# Patient Record
Sex: MALE | Race: WHITE | NOT HISPANIC OR LATINO | Employment: OTHER | URBAN - METROPOLITAN AREA
[De-identification: names, ages, dates, MRNs, and addresses within clinical notes are randomized per-mention and may not be internally consistent; named-entity substitution may affect disease eponyms.]

---

## 2022-05-11 ENCOUNTER — TRANSCRIBE ORDERS (OUTPATIENT)
Dept: PHYSICAL THERAPY | Facility: CLINIC | Age: 87
End: 2022-05-11

## 2022-05-11 DIAGNOSIS — M25.552 PAIN IN LEFT HIP: Primary | ICD-10-CM

## 2022-05-16 ENCOUNTER — TREATMENT (OUTPATIENT)
Dept: PHYSICAL THERAPY | Facility: CLINIC | Age: 87
End: 2022-05-16

## 2022-05-16 DIAGNOSIS — M25.552 LEFT HIP PAIN: Primary | ICD-10-CM

## 2022-05-16 PROCEDURE — 97162 PT EVAL MOD COMPLEX 30 MIN: CPT | Performed by: PHYSICAL THERAPIST

## 2022-05-16 PROCEDURE — 97110 THERAPEUTIC EXERCISES: CPT | Performed by: PHYSICAL THERAPIST

## 2022-05-16 NOTE — PROGRESS NOTES
Physical Therapy Initial Evaluation and Plan of Care        Patient: Cresencio Blunt   : 3/29/1932  Diagnosis/ICD-10 Code:  Left hip pain [M25.552]  Referring practitioner: MILE Roy  Date of Initial Visit: 2022  Today's Date: 2022  Patient seen for 1 sessions           Subjective Questionnaire: LEFS:       Subjective Evaluation    History of Present Illness  Date of onset: 2022  Mechanism of injury: S/p L intertrochanteric fracture treated by CMN/IMN 15 weeks ago. Patient fell and fractured his L hip when his L foot caused him to trip. He did not have AFO or use AD prior to hip fracture. He reports living with L foot drop since 's after low back injury experienced in [place]. Patient spent 6 weeks in SNF and felt little benefit from stay.     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  Location: L hip and knee  Exacerbated by: weight bearing, certain movements.    Social Support  Lives in: multiple-level home (with chair lift)    Hand dominance: right    Patient Goals  Patient goals for therapy: independence with ADLs/IADLs, increased strength and improved balance             Objective          Active Range of Motion   Left Hip   Flexion: 95 degrees     Right Hip   Flexion: 120 degrees   Left Knee   Flexion: 125 degrees   Extension: WFL    Right Knee   Flexion: 130 degrees   Extension: WFL    Passive Range of Motion   Left Hip   Flexion: 110 degrees with pain  Abduction: 20 degrees  with pain  Adduction: WFL  External rotation (90/90): 25 degrees with pain  Internal rotation (90/90): 15 degrees with pain    Strength/Myotome Testing     Left Hip   Planes of Motion   Flexion: 4-  Extension: 3- (Tested in plantigrade)  Abduction: 2+  External rotation: 3    Right Hip   Planes of Motion   Flexion: 4+  Extension: 3- (Tested in plantigrade)  Abduction: 3-  External rotation: 4    Left Knee   Flexion: 4  Extension: 3+    Right Knee   Flexion: 4+  Extension: 4+    Left Ankle/Foot   Left ankle dorsiflexion strength: AFO.  Left ankle plantar flexion strength: AFO.  Left ankle inversion strength: AFO.  Left ankle eversion strength: AFO.    Right Ankle/Foot   Dorsiflexion: 4+  Plantar flexion: 4  Inversion: 4+  Eversion: 4    Additional Strength Details  15 deg quad lag on L during SLR    Tests     Left Hip   Positive FADIR.   Negative scour.     Ambulation     Comments   Reciprocal patterning using Rollator, forward trunk lean with increased WB through UEs, lateral off of balance toward R with self correction    When using gait belt for CGA without AD, patient demonstrated loss of balance requiring PT assist when changing direction, initial contact foot flat with each foot when cued to hit with heel first on the opposite side.    Functional Assessment     Single Leg Stance   Left: 0 seconds  Right: 2 seconds    Comments  5x sit to stand: 30 sec with B UEs on knees, 4-5 failed standing attempts          Assessment & Plan     Assessment  Impairments: abnormal coordination, abnormal gait, abnormal or restricted ROM, activity intolerance, impaired balance, impaired physical strength, lacks appropriate home exercise program, pain with function, safety issue and weight-bearing intolerance  Functional Limitations: carrying objects, sleeping, walking, uncomfortable because of pain, moving in bed, sitting and standing  Assessment details: Patient presents with L>R LE muscle weakness and gait/balance impairment  secondary to L hip fracture treated with CMN/IMN 15 weeks ago and chronic L foot drop.     Barriers to therapy: age    Goals  Plan Goals: Short Term Goals 4 weeks  1. Patient to be compliant with initial HEP  2. 5x sit to  < 20 sec with B UEs.  3. LE strength to >3/5   4. Patient to improve LEFS score to at least 30/80.    Long Term Goals 8 weeks  1. Patient to be independent with HEP.  2. 5x sit to  < 15 sec without UE assist.  3. Patient to ambulate community distances with least restrictive AD or no AD at all  4. Patient to demonstrate >10 sec each with single leg stance time.  5. Patient to improve LEFS score to at least 35/80.    Plan  Therapy options: will be seen for skilled therapy services  Planned modality interventions: dry needling, cryotherapy, electrical stimulation/Russian stimulation, TENS and thermotherapy (hydrocollator packs)  Planned therapy interventions: manual therapy, motor coordination training, neuromuscular re-education, postural training, soft tissue mobilization, spinal/joint mobilization, strengthening, stretching, therapeutic activities, transfer training, IADL retraining, joint mobilization, home exercise program, functional ROM exercises, flexibility, gait training, ADL retraining, abdominal trunk stabilization and balance/weight-bearing training  Frequency: 2x week  Duration in weeks: 8  Treatment plan discussed with: patient  Plan details: 2x/week for 8 weeks        Timed:  Manual Therapy:    0     mins  34957;  Therapeutic Exercise:    15     mins  20170;     Neuromuscular Marcelo:    0    mins  57930;    Therapeutic Activity:     0     mins  28311;     Gait Trainin     mins  81608;     Ultrasound:     0     mins  16932;    Electrical Stimulation:    0     mins  28069 ( );    Untimed:  Electrical Stimulation:    0     mins  78489 ( );  Mechanical Traction:    0     mins  64078;     Timed Treatment:   15   mins   Total Treatment:     55    mins    PT SIGNATURE: Adriano , PT   PT License [id number]  DATE TREATMENT INITIATED: 5/16/2022    Initial Certification  Certification Period: 8/14/2022  I certify that the therapy services are furnished while this patient is under my care.  The services outlined above are required by this patient, and will be reviewed every 90 days.     PHYSICIAN: Norma Bueno PA      DATE:     Please sign and return via fax to [phone].. Thank you, [facility].

## 2022-05-19 ENCOUNTER — TREATMENT (OUTPATIENT)
Dept: PHYSICAL THERAPY | Facility: CLINIC | Age: 87
End: 2022-05-19

## 2022-05-19 DIAGNOSIS — M25.552 LEFT HIP PAIN: Primary | ICD-10-CM

## 2022-05-19 PROCEDURE — 97112 NEUROMUSCULAR REEDUCATION: CPT | Performed by: PHYSICAL THERAPIST

## 2022-05-19 PROCEDURE — 97110 THERAPEUTIC EXERCISES: CPT | Performed by: PHYSICAL THERAPIST

## 2022-05-19 NOTE — PROGRESS NOTES
Physical Therapy Daily Progress Note        Patient: Cresencio Blunt   : 3/29/1932  Diagnosis/ICD-10 Code:  Left hip pain [M25.552]  Referring practitioner: MILE Roy  Date of Initial Visit: Type: THERAPY  Noted: 2022  Today's Date: 2022  Patient seen for 2 sessions           Patient reports: L groin and lower knee soreness since evaluation.      Objective   See Exercise, Manual, and Modality Logs for complete treatment.       Assessment/Plan  Initiated table LE strengthening and hip/lumbar mobility exercises to introduce program and prepare for standing activities. Good tolerance noted with all activities and patient reported fatigue toward the end of treatment when step ups were performed.         Timed:  Manual Therapy:    0     mins  96566;  Therapeutic Exercise:    38     mins  79662;     Neuromuscular Marcelo:   10    mins  59525;    Therapeutic Activity:     0     mins  64258;     Gait Trainin     mins  88080;     Ultrasound:     0     mins  73245;    Electrical Stimulation:    0     mins  51663 ( );    Untimed:  Electrical Stimulation:    0     mins  03141 ( );  Mechanical Traction:    0     mins  62444;     Timed Treatment:   48   mins   Total Treatment:     48   mins    Adriano Denton PT  Physical Therapist

## 2022-05-24 ENCOUNTER — TREATMENT (OUTPATIENT)
Dept: PHYSICAL THERAPY | Facility: CLINIC | Age: 87
End: 2022-05-24

## 2022-05-24 DIAGNOSIS — M25.552 LEFT HIP PAIN: Primary | ICD-10-CM

## 2022-05-24 PROCEDURE — 97110 THERAPEUTIC EXERCISES: CPT | Performed by: PHYSICAL THERAPIST

## 2022-05-24 PROCEDURE — 97530 THERAPEUTIC ACTIVITIES: CPT | Performed by: PHYSICAL THERAPIST

## 2022-05-24 PROCEDURE — 97112 NEUROMUSCULAR REEDUCATION: CPT | Performed by: PHYSICAL THERAPIST

## 2022-05-24 NOTE — PROGRESS NOTES
Physical Therapy Daily Progress Note        Patient: Cresencio Blunt   : 3/29/1932  Diagnosis/ICD-10 Code:  Left hip pain [M25.552]  Referring practitioner: MILE Roy  Date of Initial Visit: Type: THERAPY  Noted: 2022  Today's Date: 2022  Patient seen for 3 sessions           Patient reports: L hip soreness since last visit but his knee has felt fine. He does not feel like exercises are too much and that he has been consistent with HEP.      Objective   See Exercise, Manual, and Modality Logs for complete treatment.       Assessment/Plan  Progressed quad and hip strengthening with overall good tolerance. Able to initiated unsupported step ups as well. Patient unable to correct step length due to L hip fatigue. Patient seems unaware of the degree of L hip fatigue that occurs, which could be a safety issue.            Timed:  Manual Therapy:    0     mins  40254;  Therapeutic Exercise:    30     mins  61069;     Neuromuscular Marcelo:   10    mins  04398;    Therapeutic Activity:     14     mins  02656;     Gait Trainin     mins  13929;     Ultrasound:     0     mins  23539;    Electrical Stimulation:    0     mins  84205 ( );    Untimed:  Electrical Stimulation:    0     mins  95633 ( );  Mechanical Traction:    0     mins  16574;     Timed Treatment:   54   mins   Total Treatment:     54   mins    Adriano Denton PT  Physical Therapist

## 2022-05-27 ENCOUNTER — TREATMENT (OUTPATIENT)
Dept: PHYSICAL THERAPY | Facility: CLINIC | Age: 87
End: 2022-05-27

## 2022-05-27 DIAGNOSIS — M25.552 LEFT HIP PAIN: Primary | ICD-10-CM

## 2022-05-27 PROCEDURE — 97110 THERAPEUTIC EXERCISES: CPT | Performed by: PHYSICAL THERAPIST

## 2022-05-27 PROCEDURE — 97112 NEUROMUSCULAR REEDUCATION: CPT | Performed by: PHYSICAL THERAPIST

## 2022-05-27 PROCEDURE — 97530 THERAPEUTIC ACTIVITIES: CPT | Performed by: PHYSICAL THERAPIST

## 2022-05-27 NOTE — PROGRESS NOTES
Physical Therapy Daily Progress Note        Patient: Cresencio Blunt   : 3/29/1932  Diagnosis/ICD-10 Code:  Left hip pain [M25.552]  Referring practitioner: MILE Roy  Date of Initial Visit: Type: THERAPY  Noted: 2022  Today's Date: 2022  Patient seen for 4 sessions           Patient reports: L knee and thigh soreness since last visit.      Objective   See Exercise, Manual, and Modality Logs for complete treatment.       Assessment/Plan  Continued intermittent loss of balance noted with gait training when no AD is being used, especially during gradual turns. Did not progress routine as patient's wife reports the patient tells her he is fatigued once he gets home from visits, which contradicts patient's report during visits.             Timed:  Manual Therapy:    0     mins  86330;  Therapeutic Exercise:    15     mins  67406;     Neuromuscular Marcelo:   15    mins  98233;    Therapeutic Activity:     15     mins  48504;     Gait Trainin     mins  84938;     Ultrasound:     0     mins  03740;    Electrical Stimulation:    0     mins  65376 ( );    Untimed:  Electrical Stimulation:    0     mins  75794 ( );  Mechanical Traction:    0     mins  79865;     Timed Treatment:   45   mins   Total Treatment:     45   mins    Adriano Denton PT  Physical Therapist

## 2022-05-31 ENCOUNTER — TREATMENT (OUTPATIENT)
Dept: PHYSICAL THERAPY | Facility: CLINIC | Age: 87
End: 2022-05-31

## 2022-05-31 DIAGNOSIS — M25.552 LEFT HIP PAIN: Primary | ICD-10-CM

## 2022-05-31 PROCEDURE — 97112 NEUROMUSCULAR REEDUCATION: CPT | Performed by: PHYSICAL THERAPIST

## 2022-05-31 PROCEDURE — 97110 THERAPEUTIC EXERCISES: CPT | Performed by: PHYSICAL THERAPIST

## 2022-05-31 NOTE — PROGRESS NOTES
Physical Therapy Daily Progress Note        Patient: Cresencio Blunt   : 3/29/1932  Diagnosis/ICD-10 Code:  Left hip pain [M25.552]  Referring practitioner: MILE Roy  Date of Initial Visit: Type: THERAPY  Noted: 2022  Today's Date: 2022  Patient seen for 5 sessions           Patient reports: L knee and thigh soreness when he walks. Patient's wife states they need to leave early to make a different doctor's visit.      Objective   See Exercise, Manual, and Modality Logs for complete treatment.       Assessment/Plan  With treatment time limited, today's visit focused more on gait activities. Patient demonstrated no loss of balance during gait for the first time, but continued to struggle with gait deviations from L hip weakness. These deviations worsened as he fatigued during gait, as he demonstrated more pronounced Trendelenberg pattern, but he reported less knee soreness after treatment.            Timed:  Manual Therapy:    0     mins  14115;  Therapeutic Exercise:    15     mins  91346;     Neuromuscular Marcelo:   23    mins  48092;    Therapeutic Activity:     0     mins  59604;     Gait Trainin     mins  30427;     Ultrasound:     0     mins  13470;    Electrical Stimulation:    0     mins  60730 ( );    Untimed:  Electrical Stimulation:    0     mins  30480 ( );  Mechanical Traction:    0     mins  28289;     Timed Treatment:   38   mins   Total Treatment:     38   mins    Adriano Denton PT  Physical Therapist

## 2022-06-07 ENCOUNTER — TREATMENT (OUTPATIENT)
Dept: PHYSICAL THERAPY | Facility: CLINIC | Age: 87
End: 2022-06-07

## 2022-06-07 DIAGNOSIS — M25.552 LEFT HIP PAIN: Primary | ICD-10-CM

## 2022-06-07 PROCEDURE — 97110 THERAPEUTIC EXERCISES: CPT | Performed by: PHYSICAL THERAPIST

## 2022-06-07 PROCEDURE — 97112 NEUROMUSCULAR REEDUCATION: CPT | Performed by: PHYSICAL THERAPIST

## 2022-06-07 NOTE — PROGRESS NOTES
Physical Therapy Daily Progress Note        Patient: Cresencio Blunt   : 3/29/1932  Diagnosis/ICD-10 Code:  Left hip pain [M25.552]  Referring practitioner: MILE Roy  Date of Initial Visit: Type: THERAPY  Noted: 2022  Today's Date: 2022  Patient seen for 6 sessions           Patient reports: walking some in grass using his RW over the weekend. Also reporting increased L lateral knee pain.      Objective   See Exercise, Manual, and Modality Logs for complete treatment.       Assessment/Plan  Started with gait activities but he was limited by hip fatigue and knee pain. Patient and spouse were advised to apply ice to his L knee at home to alleviate likely arthritic symptoms. Transitioned to table exercises for knee, hip, and lumbar mobility after this to avoid over taxing his knee.            Timed:  Manual Therapy:    0     mins  25944;  Therapeutic Exercise:    28     mins  60526;     Neuromuscular Marcelo:   15    mins  18856;    Therapeutic Activity:     0     mins  52453;     Gait Trainin     mins  96306;     Ultrasound:     0     mins  79460;    Electrical Stimulation:    0     mins  79882 ( );    Untimed:  Electrical Stimulation:    0     mins  87834 ( );  Mechanical Traction:    0     mins  34855;     Timed Treatment:   43   mins   Total Treatment:     43   mins    Adriano Denton PT  Physical Therapist

## 2022-06-09 ENCOUNTER — TREATMENT (OUTPATIENT)
Dept: PHYSICAL THERAPY | Facility: CLINIC | Age: 87
End: 2022-06-09

## 2022-06-09 DIAGNOSIS — M25.552 LEFT HIP PAIN: Primary | ICD-10-CM

## 2022-06-09 PROCEDURE — 97110 THERAPEUTIC EXERCISES: CPT | Performed by: PHYSICAL THERAPIST

## 2022-06-09 PROCEDURE — 97112 NEUROMUSCULAR REEDUCATION: CPT | Performed by: PHYSICAL THERAPIST

## 2022-06-09 NOTE — PROGRESS NOTES
Physical Therapy Daily Progress Note        Patient: Cresencio Blunt   : 3/29/1932  Diagnosis/ICD-10 Code:  Left hip pain [M25.552]  Referring practitioner: MILE Roy  Date of Initial Visit: Type: THERAPY  Noted: 2022  Today's Date: 2022  Patient seen for 7 sessions           Patient reports: continued L knee pain even with application of ice.      Objective   See Exercise, Manual, and Modality Logs for complete treatment.       Assessment/Plan  Advanced L lateral hip strengthening and dynamic gait activities with addition of cone walking activities. Occasional loss of balance noted, but patient did not require PT-assist to recover. His speed with change of direction remains slow, with gait speed being emphasized during treatment. Ice applied to L knee after treatment as it remained unclear if patient had applied adequate size/duration of cryotherapy at home.            Timed:  Manual Therapy:    0     mins  28860;  Therapeutic Exercise:    20     mins  51686;     Neuromuscular Marcelo:   30    mins  80612;    Therapeutic Activity:     0     mins  31239;     Gait Trainin     mins  82436;     Ultrasound:     0     mins  59377;    Electrical Stimulation:    0     mins  08290 ( );    Untimed:  Electrical Stimulation:    0     mins  89530 ( );  Mechanical Traction:    0     mins  95150;     Timed Treatment:   50   mins   Total Treatment:     60   mins    Adriano Denton PT  Physical Therapist

## 2022-06-14 ENCOUNTER — TREATMENT (OUTPATIENT)
Dept: PHYSICAL THERAPY | Facility: CLINIC | Age: 87
End: 2022-06-14

## 2022-06-14 DIAGNOSIS — M25.552 LEFT HIP PAIN: Primary | ICD-10-CM

## 2022-06-14 PROCEDURE — 97112 NEUROMUSCULAR REEDUCATION: CPT | Performed by: PHYSICAL THERAPIST

## 2022-06-14 PROCEDURE — 97110 THERAPEUTIC EXERCISES: CPT | Performed by: PHYSICAL THERAPIST

## 2022-06-16 ENCOUNTER — TREATMENT (OUTPATIENT)
Dept: PHYSICAL THERAPY | Facility: CLINIC | Age: 87
End: 2022-06-16

## 2022-06-16 DIAGNOSIS — M25.552 LEFT HIP PAIN: Primary | ICD-10-CM

## 2022-06-16 PROCEDURE — 97110 THERAPEUTIC EXERCISES: CPT | Performed by: PHYSICAL THERAPIST

## 2022-06-16 PROCEDURE — 97112 NEUROMUSCULAR REEDUCATION: CPT | Performed by: PHYSICAL THERAPIST

## 2022-06-16 NOTE — PROGRESS NOTES
"Re-Assessment / Re-Certification        Patient: Cresencio Blunt   : 3/29/1932  Diagnosis/ICD-10 Code:  Left hip pain [M25.552]  Referring practitioner: MILE Roy  Date of Initial Visit: Type: THERAPY  Noted: 2022  Today's Date: 2022  Patient seen for 9 sessions      Subjective:     Subjective Questionnaire: LEFS:   Clinical Progress: improved  Home Program Compliance: Yes  Treatment has included: therapeutic exercise, neuromuscular re-education and manual therapy    Subjective Evaluation    History of Present Illness  Date of onset: 2022  Mechanism of injury: S/p L intertrochanteric fracture treated by CMN/IMN 15 weeks ago. Patient fell and fractured his L hip when his L foot caused him to trip. He did not have AFO or use AD prior to hip fracture. He reports living with L foot drop since 's after low back injury experienced in [place].     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 States he received surgery yesterday to remove a lesion on his R cheek.Pain  Current pain ratin  At best pain ratin  At worst pain ratin (doesn't classify symptoms as pain)  Location: L knee  Exacerbated by: weight bearing.         Objective          Active Range of Motion   Left Hip   Flexion: 105 degrees     Right Hip   Flexion: 120 degrees   Left Knee   Flexion: 127 degrees     Passive Range of Motion   Left Hip "   Flexion: 120 degrees with pain  Extension: 10 degrees   Abduction: 25 degrees with pain  Adduction: WFL  External rotation (90/90): 30 degrees with pain  Internal rotation (90/90): 20 degrees with pain  Left Knee   Flexion: 131 degrees     Strength/Myotome Testing     Left Hip   Planes of Motion   Flexion: 4  Extension: 3- (Tested in plantigrade)  Abduction: 3  External rotation: 4    Right Hip   Planes of Motion   Flexion: 4+  Extension: 3- (Tested in plantigrade)  External rotation: 4    Left Knee   Flexion: 4+  Extension: 4+    Right Knee   Flexion: 4+  Extension: 4+    Left Ankle/Foot   Left ankle dorsiflexion strength: AFO.  Left ankle plantar flexion strength: AFO.  Left ankle inversion strength: AFO.  Left ankle eversion strength: AFO.    Right Ankle/Foot   Dorsiflexion: 4+  Plantar flexion: 4  Inversion: 4+  Eversion: 4    Additional Strength Details  8 deg quad lag on L during SLR    Ambulation     Comments   When using gait belt for CGA without AD, patient demonstrated loss of balance requiring PT assist when changing direction, initial contact foot flat with each foot when cued to hit with heel first on the opposite side.    Functional Assessment     Single Leg Stance   Left: 0 seconds  Right: 3 seconds    Comments  5x sit to stand: 20 sec using B UEs on knees but no failed attempts      Assessment & Plan     Assessment  Impairments: abnormal coordination, abnormal gait, abnormal or restricted ROM, activity intolerance, impaired balance, impaired physical strength, lacks appropriate home exercise program, pain with function, safety issue and weight-bearing intolerance  Functional Limitations: carrying objects, sleeping, walking, uncomfortable because of pain, moving in bed, sitting and standing  Assessment details: Patient presents with L>R LE muscle weakness and gait/balance impairment secondary to L hip fracture treated with CMN/IMN 15 weeks ago and chronic L foot drop.     6/16- Patient has received  9 PT visits. He no longer reports L hip pain, but he continues to be limited by L knee pain. He demonstrates improved gross L LE strength and L hip ROM, though his glutes remain very weak. Static and dynamic balance remain impaired but show some improvement.    Barriers to therapy: age, L foot drop    Goals  Plan Goals: Short Term Goals 4 weeks  1. Patient to be compliant with initial HEP. MET  2. 5x sit to  < 20 sec with B UEs. MET  3. LE strength to >3/5. 75% MET  4. Patient to improve LEFS score to at least 30/80. 40% MET    Long Term Goals 8 weeks  1. Patient to be independent with HEP. NOT MET  2. 5x sit to  < 15 sec without UE assist. NOT MET  3. Patient to ambulate community distances with least restrictive AD or no AD at all. NOT MET  4. Patient to demonstrate >10 sec each with single leg stance time. NOT MET  5. Patient to improve LEFS score to at least 35/80. NOT MET    Plan  Therapy options: will be seen for skilled therapy services  Planned modality interventions: dry needling, cryotherapy, electrical stimulation/Russian stimulation, TENS and thermotherapy (hydrocollator packs)  Planned therapy interventions: manual therapy, motor coordination training, neuromuscular re-education, postural training, soft tissue mobilization, spinal/joint mobilization, strengthening, stretching, therapeutic activities, transfer training, IADL retraining, joint mobilization, home exercise program, functional ROM exercises, flexibility, gait training, ADL retraining, abdominal trunk stabilization and balance/weight-bearing training  Frequency: 2x week  Duration in weeks: 4  Treatment plan discussed with: patient  Plan details: 2x/week for 4 weeks      Progress toward previous goals: Partially Met      Timeframe: 1 month  Prognosis to achieve goals: good    PT Signature: Adriano Denton, PT  PT License [id number]    Based upon review of the patient's progress and continued therapy plan, it is my medical opinion that  Cresencio Blunt should continue physical therapy treatment at [facility]  [address] [place] [id number] [phone].    Signature: __________________________________  Norma Bueno PA    Timed:  Manual Therapy:    0     mins  64149;  Therapeutic Exercise:    35     mins  43807;     Neuromuscular Marcelo:    15    mins  81865;    Therapeutic Activity:     0     mins  10428;     Gait Trainin     mins  21972;     Ultrasound:     0     mins  66343;    Electrical Stimulation:    0     mins  43409 ( );    Untimed:  Electrical Stimulation:    0     mins  65031 ( );  Mechanical Traction:    0     mins  79992;     Timed Treatment:   50   mins   Total Treatment:     50   mins

## 2022-06-21 ENCOUNTER — TREATMENT (OUTPATIENT)
Dept: PHYSICAL THERAPY | Facility: CLINIC | Age: 87
End: 2022-06-21

## 2022-06-21 DIAGNOSIS — M25.552 LEFT HIP PAIN: Primary | ICD-10-CM

## 2022-06-21 PROCEDURE — 97112 NEUROMUSCULAR REEDUCATION: CPT | Performed by: PHYSICAL THERAPIST

## 2022-06-21 PROCEDURE — 97110 THERAPEUTIC EXERCISES: CPT | Performed by: PHYSICAL THERAPIST

## 2022-06-21 NOTE — PROGRESS NOTES
Physical Therapy Daily Progress Note        Patient: Cresencio Blunt   : 3/29/1932  Diagnosis/ICD-10 Code:  Left hip pain [M25.552]  Referring practitioner: MILE Roy  Date of Initial Visit: Type: THERAPY  Noted: 2022  Today's Date: 2022  Patient seen for 10 sessions           Patient reports: his L knee continues to hurt and his L hip is sore today as well. He was informed that he is not able to move up his scheduled orthopedic follow up and must wait another month to be seen. He and his wife state PT sessions do not seem to aggravate his symptoms.      Objective   See Exercise, Manual, and Modality Logs for complete treatment.       Assessment/Plan  Advanced //activities with longer duration or more reps but he required a sitting break after this during cone navigation due to L knee pain. Pain was significantly less after sitting break and LAQs (unclear if rest or exercise was the alleviating factor). Added diagonal cone activity for continued dynamic balance work. He reported his hip feeling better after treatment and his L knee only hurting when he walks.            Timed:  Manual Therapy:    0     mins  58309;  Therapeutic Exercise:    20     mins  11205;     Neuromuscular Marcelo:   28    mins  63200;    Therapeutic Activity:     0     mins  80368;     Gait Trainin     mins  23820;     Ultrasound:     0     mins  07402;    Electrical Stimulation:    0     mins  87253 ( );    Untimed:  Electrical Stimulation:    0     mins  53253 ( );  Mechanical Traction:    0     mins  41328;     Timed Treatment:   48   mins   Total Treatment:     48   mins    Adriano Denton PT  Physical Therapist

## 2022-06-23 ENCOUNTER — TREATMENT (OUTPATIENT)
Dept: PHYSICAL THERAPY | Facility: CLINIC | Age: 87
End: 2022-06-23

## 2022-06-23 DIAGNOSIS — M25.552 LEFT HIP PAIN: Primary | ICD-10-CM

## 2022-06-23 PROCEDURE — 97110 THERAPEUTIC EXERCISES: CPT | Performed by: PHYSICAL THERAPIST

## 2022-06-23 PROCEDURE — 97112 NEUROMUSCULAR REEDUCATION: CPT | Performed by: PHYSICAL THERAPIST

## 2022-06-23 NOTE — PROGRESS NOTES
Physical Therapy Daily Progress Note        Patient: Cresencio Blunt   : 3/29/1932  Diagnosis/ICD-10 Code:  Left hip pain [M25.552]  Referring practitioner: MILE Roy  Date of Initial Visit: Type: THERAPY  Noted: 2022  Today's Date: 2022  Patient seen for 11 sessions           Patient reports: his knee was pretty sore after last visit.      Objective   See Exercise, Manual, and Modality Logs for complete treatment.       Assessment/Plan  Added UE task of tossing a ball to both static and dynamic balance activities. Patient was able to self-correct any loss of balance without PT assistance though he was CGA whenever standing. He tolerated standing activities for about 30 min before reporting increased knee discomfort, at which time activities were transitioned to sitting or lying on the table. Quad fatigue noted after treatment.            Timed:  Manual Therapy:    0     mins  70789;  Therapeutic Exercise:    20     mins  05925;     Neuromuscular Marcelo:   25    mins  31523;    Therapeutic Activity:     0     mins  91430;     Gait Trainin     mins  73660;     Ultrasound:     0     mins  77499;    Electrical Stimulation:    0     mins  71553 ( );    Untimed:  Electrical Stimulation:    0     mins  42196 ( );  Mechanical Traction:    0     mins  62116;     Timed Treatment:   45   mins   Total Treatment:     45   mins    Adriano Denton PT  Physical Therapist

## 2022-06-28 ENCOUNTER — TREATMENT (OUTPATIENT)
Dept: PHYSICAL THERAPY | Facility: CLINIC | Age: 87
End: 2022-06-28

## 2022-06-28 DIAGNOSIS — M25.552 LEFT HIP PAIN: Primary | ICD-10-CM

## 2022-06-28 PROCEDURE — 97530 THERAPEUTIC ACTIVITIES: CPT | Performed by: PHYSICAL THERAPIST

## 2022-06-28 PROCEDURE — 97112 NEUROMUSCULAR REEDUCATION: CPT | Performed by: PHYSICAL THERAPIST

## 2022-06-28 PROCEDURE — 97110 THERAPEUTIC EXERCISES: CPT | Performed by: PHYSICAL THERAPIST

## 2022-06-28 NOTE — PROGRESS NOTES
Physical Therapy Daily Progress Note        Patient: Cresencio Blunt   : 3/29/1932  Diagnosis/ICD-10 Code:  Left hip pain [M25.552]  Referring practitioner: MILE Roy  Date of Initial Visit: Type: THERAPY  Noted: 2022  Today's Date: 2022  Patient seen for 12 sessions           Patient reports: putting cream on his L knee has reduced his knee pain.      Objective   See Exercise, Manual, and Modality Logs for complete treatment.       Assessment/Plan  Continued focus on dynamic balance. Less knee pain reported today, only toward the very end of treatment.            Timed:  Manual Therapy:    0     mins  34683;  Therapeutic Exercise:    13     mins  94154;     Neuromuscular Marcelo:   30    mins  97014;    Therapeutic Activity:     10     mins  45741;     Gait Trainin     mins  29303;     Ultrasound:     0     mins  30345;    Electrical Stimulation:    0     mins  16179 ( );    Untimed:  Electrical Stimulation:    0     mins  99801 ( );  Mechanical Traction:    0     mins  33575;     Timed Treatment:   53   mins   Total Treatment:     53   mins    Adriano Denton PT  Physical Therapist

## 2022-07-05 ENCOUNTER — TREATMENT (OUTPATIENT)
Dept: PHYSICAL THERAPY | Facility: CLINIC | Age: 87
End: 2022-07-05

## 2022-07-05 DIAGNOSIS — M25.552 LEFT HIP PAIN: Primary | ICD-10-CM

## 2022-07-05 PROCEDURE — 97112 NEUROMUSCULAR REEDUCATION: CPT | Performed by: PHYSICAL THERAPIST

## 2022-07-05 PROCEDURE — 97110 THERAPEUTIC EXERCISES: CPT | Performed by: PHYSICAL THERAPIST

## 2022-07-05 NOTE — PROGRESS NOTES
Physical Therapy Daily Treatment Note      Patient: Cresencio Blunt   : 3/29/1932  Referring practitioner: MILE Roy  Date of Initial Visit: Type: THERAPY  Noted: 2022  Today's Date: 2022  Patient seen for 13 sessions       Visit Diagnoses:    ICD-10-CM ICD-9-CM   1. Left hip pain  M25.552 719.45       Subjective   Patient reports his knee was sore the past few days, pain comes and goes. States his left knee pain is typically lateral joint line region. He denies hip pain, states he has been pleased with left hip. States  his pain level is 3/10 upon arrival for left knee.     Objective   See Exercise, Manual, and Modality Logs for complete treatment.       Assessment/Plan   Pt ambulated well with trial of SPC in RU, fitted correctly to his height. Able to walk further distance, PT SBA, without LOB during turns or directional changes. Pt ambulated with step through gait pattern, improved with practice. Could consider option of SPC for inside the home, pending balance and progression the next few visits. He tolerated session w/o, denies left hip pain, left knee pain improved with seated rest breaks.       Timed:         Manual Therapy:    0     mins  95844;     Therapeutic Exercise:    24     mins  04196;     Neuromuscular Marcelo:    14    mins  16688;    Therapeutic Activity:     0     mins  66593;     Gait Trainin     mins  24269;     Ultrasound:     0     mins  49612;    Ionto                               0    mins   71007  Self Care                       0     mins   19525  Canalith Repos    0     mins 34960      Un-Timed:  Electrical Stimulation:    0     mins  80139 ( );  Dry Needling     0     mins self-pay  Traction     0     mins 75399      Timed Treatment:   38   mins   Total Treatment:     40   mins    Corinne E. Perkins, PT  [place] License: [id number]

## 2022-07-28 ENCOUNTER — TRANSCRIBE ORDERS (OUTPATIENT)
Dept: PHYSICAL THERAPY | Facility: CLINIC | Age: 87
End: 2022-07-28

## 2022-07-28 DIAGNOSIS — M25.552 LEFT HIP PAIN: Primary | ICD-10-CM

## 2022-08-12 ENCOUNTER — TREATMENT (OUTPATIENT)
Dept: PHYSICAL THERAPY | Facility: CLINIC | Age: 87
End: 2022-08-12

## 2022-08-12 DIAGNOSIS — M25.552 LEFT HIP PAIN: Primary | ICD-10-CM

## 2022-08-12 PROCEDURE — 97161 PT EVAL LOW COMPLEX 20 MIN: CPT | Performed by: PHYSICAL THERAPIST

## 2022-08-12 PROCEDURE — 97530 THERAPEUTIC ACTIVITIES: CPT | Performed by: PHYSICAL THERAPIST

## 2022-08-12 NOTE — PROGRESS NOTES
Physical Therapy Initial Evaluation and Plan of Care        Patient: Cresencio Blunt   : 3/29/1932  Diagnosis/ICD-10 Code:  Left hip pain [M25.552]  Referring practitioner: MILE Roy  Date of Initial Visit: 2022  Today's Date: 2022  Patient seen for 1 sessions           Subjective Questionnaire: LEFS:       Subjective Evaluation    History of Present Illness  Mechanism of injury: S/p L intertrochanteric fracture treated by CMN/IMN around 22. Patient fell and fractured his L hip when his L foot caused him to trip. He did not have AFO or use AD prior to hip fracture. Patient had been in outpatient PT for strength and gait until experiencing CVA on  that left him with L peripheral vision deficits and cognitive impairments (focus, memory). Patient and wife feel like he has lost balance and strength since the CVA.    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)    Pain  No pain reported  Location: L knee and hip were previously painful during previous bout of PT    Patient Goals  Patient goals for therapy: increased strength, improved balance and independence with ADLs/IADLs             Objective          Static Posture     Comments  Sitting L UE /56 and HR 54 bpm    Strength/Myotome Testing     Left Hip   Planes of Motion   Flexion: 4  Extension: 3- (back pain)  Abduction: 3-  External rotation: 3+    Right Hip   Planes of Motion   Flexion: 4+  Extension: 3  Abduction: 3+  External rotation: 4-    Left Knee   Flexion: 4-  Extension: 4 (knee pain)    Right Knee   Flexion:  4  Extension: 4+    Ambulation     Comments   Reciprocal patterning using Rollator, AFO on L, forward trunk lean with increased WB through UEs, decreased stance on L     When using gait belt for CGA without AD using AFO on L, patient demonstrates stable gait walking forward but regresses with L foot flat at initial contact as he fatigues.     Functional Assessment     Comments  Bed mobility: independent but slow rolling over all directions and supine<>sit    5x sit to stand: 24 sec using B UEs on Rollator with 2 failed attempts    6 min walk test: 315' using Rollator with sitting rest break from 2:30 to 5:00          Assessment & Plan     Assessment  Impairments: abnormal coordination, abnormal gait, abnormal muscle firing, abnormal or restricted ROM, activity intolerance, impaired balance, impaired physical strength, lacks appropriate home exercise program, pain with function and safety issue  Functional Limitations: carrying objects, lifting, walking, standing and stooping  Assessment details: Patient presents with impaired safety and gait/balance deficits secondary to L>R LE weakness, L foot drop, and recent CVA causing L visual field loss and cognitive impairment (impaired decision-making, memory loss).    Barriers to therapy: cognitive deficits s/p CVA, L visual field deficits, comorbidities  Prognosis: good    Goals  Plan Goals: Short Term Goals 4 weeks  1. Patient to be compliant with initial HEP  2. 5x sit to  < 20 sec with no failed attempts using B UEs.  3. L hip gross strength to >3/5   4. Patient to improve LEFS score to at least 36/80  5. 6 min walk test to >450' using least restrictive AD    Long Term Goals 8 weeks  1. Patient to be independent with HEP.  2. 5x sit to  < 30 sec without UEs.  3. 6 min walk test to >600' using least restrictive AD  4. Patient to improve LEFS score to at least 41/80    Plan  Therapy options: will be seen for skilled therapy services  Planned modality  interventions: cryotherapy, thermotherapy (hydrocollator packs), dry needling, TENS and electrical stimulation/Russian stimulation  Planned therapy interventions: ADL retraining, balance/weight-bearing training, functional ROM exercises, home exercise program, transfer training, therapeutic activities, strengthening, neuromuscular re-education, abdominal trunk stabilization, motor coordination training, stretching, manual therapy, joint mobilization, spinal/joint mobilization and soft tissue mobilization  Frequency: 2x week  Duration in weeks: 8  Plan details: 2x/week for 8 weeks        Timed:  Manual Therapy:    0     mins  62046;  Therapeutic Exercise:    0     mins  45857;     Neuromuscular Marcelo:    0    mins  13021;    Therapeutic Activity:     15     mins  80346;     Gait Trainin     mins  48488;     Ultrasound:     0     mins  28303;    Electrical Stimulation:    0     mins  54865 ( );    Untimed:  Electrical Stimulation:    0     mins  04397 ( );  Mechanical Traction:    0     mins  89569;     Timed Treatment:   15   mins   Total Treatment:     55   mins    PT SIGNATURE: Adriano Denton PT   License Number: [id number]  DATE TREATMENT INITIATED: 2022    Initial Certification  Certification Period: 11/10/2022  I certify that the therapy services are furnished while this patient is under my care.  The services outlined above are required by this patient, and will be reviewed every 90 days.     PHYSICIAN: Norma Bueno PA      DATE:     Please sign and return via fax to [phone].. Thank you, [facility].

## 2022-08-23 ENCOUNTER — TREATMENT (OUTPATIENT)
Dept: PHYSICAL THERAPY | Facility: CLINIC | Age: 87
End: 2022-08-23

## 2022-08-23 DIAGNOSIS — M25.552 LEFT HIP PAIN: Primary | ICD-10-CM

## 2022-08-23 PROCEDURE — 97110 THERAPEUTIC EXERCISES: CPT | Performed by: PHYSICAL THERAPIST

## 2022-08-23 PROCEDURE — 97112 NEUROMUSCULAR REEDUCATION: CPT | Performed by: PHYSICAL THERAPIST

## 2022-08-23 PROCEDURE — 97530 THERAPEUTIC ACTIVITIES: CPT | Performed by: PHYSICAL THERAPIST

## 2022-08-23 NOTE — PROGRESS NOTES
Physical Therapy Daily Progress Note        Patient: Cresencio Blunt   : 3/29/1932  Diagnosis/ICD-10 Code:  Left hip pain [M25.552]  Referring practitioner: MILE Roy  Date of Initial Visit: Type: THERAPY  Noted: 2022  Today's Date: 2022  Patient seen for 2 sessions           Patient reports: his vision is worse (blurry, in stroke) but his wife states there have been no changes in vision in recent weeks. He reports no current L LE pain and his wife reports he has not complained of pain recently.      Objective   See Exercise, Manual, and Modality Logs for complete treatment.       Assessment/Plan  It appears patient's self report has become less reliable since experiencing CVA, as he initially reported visual changes as new but later states they have not changed since CVA.    Improvements in awareness of L side form initial evaluation were not carried over to today's visit, as he required significant VCs for monitoring his environment and navigating the clinic. Patient leans forward with UEs on thighs when he becomes fatigued but is inconsistent with verbalizing perceived fatigue.          Timed:  Manual Therapy:    0     mins  47372;  Therapeutic Exercise:    10     mins  18933;     Neuromuscular Marcelo:   15    mins  25716;    Therapeutic Activity:     15     mins  02687;     Gait Trainin     mins  01959;     Ultrasound:     0     mins  98528;    Electrical Stimulation:    0     mins  51077 ( );    Untimed:  Electrical Stimulation:    0     mins  75164 ( );  Mechanical Traction:    0     mins  62020;     Timed Treatment:   40   mins   Total Treatment:     40   mins    Adriano Denton PT  Physical Therapist

## 2022-08-30 ENCOUNTER — TREATMENT (OUTPATIENT)
Dept: PHYSICAL THERAPY | Facility: CLINIC | Age: 87
End: 2022-08-30

## 2022-08-30 DIAGNOSIS — M25.552 LEFT HIP PAIN: Primary | ICD-10-CM

## 2022-08-30 PROCEDURE — 97110 THERAPEUTIC EXERCISES: CPT | Performed by: PHYSICAL THERAPIST

## 2022-08-30 PROCEDURE — 97530 THERAPEUTIC ACTIVITIES: CPT | Performed by: PHYSICAL THERAPIST

## 2022-08-30 PROCEDURE — 97112 NEUROMUSCULAR REEDUCATION: CPT | Performed by: PHYSICAL THERAPIST

## 2022-08-30 NOTE — PROGRESS NOTES
Physical Therapy Daily Progress Note        Patient: Cresencio Blunt   : 3/29/1932  Diagnosis/ICD-10 Code:  Left hip pain [M25.552]  Referring practitioner: MILE Roy  Date of Initial Visit: Type: THERAPY  Noted: 2022  Today's Date: 2022  Patient seen for 3 sessions           Patient reports: no current L LE pain.      Objective   See Exercise, Manual, and Modality Logs for complete treatment.       Assessment/Plan  Continued with functional strengthening and gait/balance. Patient demonstrated poor carry over from recent visits when he was cued to turn his head to the L to maintain environmental awareness as he has L hemianopsia. He required >50% verbal cues to monitor the L side of his environment. He also struggled with perception of L, as he would slightly deviate L but overall maintain a forward direction of gait. It remains unclear how effectively he can learn to adequately adapt to his new deficits so he can graduate to a [facility] from a Rollator.            Timed:  Manual Therapy:    0     mins  19996;  Therapeutic Exercise:    10     mins  13307;     Neuromuscular Marcelo:   12    mins  57973;    Therapeutic Activity:     20     mins  38683;     Gait Trainin     mins  43927;     Ultrasound:     0     mins  65370;    Electrical Stimulation:    0     mins  88635 ( );    Untimed:  Electrical Stimulation:    0     mins  83405 ( );  Mechanical Traction:    0     mins  47034;     Timed Treatment:   42   mins   Total Treatment:     42   mins    Adriano Denton PT  Physical Therapist

## 2022-09-01 ENCOUNTER — TREATMENT (OUTPATIENT)
Dept: PHYSICAL THERAPY | Facility: CLINIC | Age: 87
End: 2022-09-01

## 2022-09-01 DIAGNOSIS — M25.552 LEFT HIP PAIN: Primary | ICD-10-CM

## 2022-09-01 PROCEDURE — 97110 THERAPEUTIC EXERCISES: CPT | Performed by: PHYSICAL THERAPIST

## 2022-09-01 PROCEDURE — 97530 THERAPEUTIC ACTIVITIES: CPT | Performed by: PHYSICAL THERAPIST

## 2022-09-01 PROCEDURE — 97112 NEUROMUSCULAR REEDUCATION: CPT | Performed by: PHYSICAL THERAPIST

## 2022-09-01 NOTE — PROGRESS NOTES
Physical Therapy Daily Progress Note        Patient: Cresencio Blunt   : 3/29/1932  Diagnosis/ICD-10 Code:  Left hip pain [M25.552]  Referring practitioner: MILE Roy  Date of Initial Visit: Type: THERAPY  Noted: 2022  Today's Date: 2022  Patient seen for 4 sessions           Patient reports: no new changes.      Objective   See Exercise, Manual, and Modality Logs for complete treatment.       Assessment/Plan  Patient demonstrated improved obstacle negotiation and decreased L LE fatigue during gait training. Added verbal identification of and pointing to objects/obstacles that he would walk by on his L side during gait to enhance awareness of the environment on his L.            Timed:  Manual Therapy:    0     mins  11695;  Therapeutic Exercise:    10     mins  35167;     Neuromuscular Marcelo:   30    mins  53357;    Therapeutic Activity:     15     mins  94520;     Gait Trainin     mins  13536;     Ultrasound:     0     mins  48171;    Electrical Stimulation:    0     mins  26265 ( );    Untimed:  Electrical Stimulation:    0     mins  33784 ( );  Mechanical Traction:    0     mins  93766;     Timed Treatment:   55   mins   Total Treatment:     55   mins    Adriano Denton PT  Physical Therapist

## 2022-09-06 ENCOUNTER — TREATMENT (OUTPATIENT)
Dept: PHYSICAL THERAPY | Facility: CLINIC | Age: 87
End: 2022-09-06

## 2022-09-06 DIAGNOSIS — M25.552 LEFT HIP PAIN: Primary | ICD-10-CM

## 2022-09-06 PROCEDURE — 97530 THERAPEUTIC ACTIVITIES: CPT | Performed by: PHYSICAL THERAPIST

## 2022-09-06 PROCEDURE — 97110 THERAPEUTIC EXERCISES: CPT | Performed by: PHYSICAL THERAPIST

## 2022-09-06 NOTE — PROGRESS NOTES
"Physical Therapy Daily Progress Note        Patient: Cresencio Blunt   : 3/29/1932  Diagnosis/ICD-10 Code:  Left hip pain [M25.552]  Referring practitioner: MILE Roy  Date of Initial Visit: Type: THERAPY  Noted: 2022  Today's Date: 2022  Patient seen for 5 sessions           Patient's wife reports patient was weaker with car transfers today      Objective   See Exercise, Manual, and Modality Logs for complete treatment.       Assessment/Plan  When attempting SPC gait training toward beginning of treatment, patient stopped after a couple steps and bent over, reporting weakness in his upper body. Upon check, sitting R UE /62 and L /59 with HR 56 bpm. Gait was again attempted after a short rest, with patient walking about 50 ft prior to repeating same behavior and reporting \"weakness of his upper body\" (but pointing to his chest sternum). Patient denied pain. Continued monitoring of BP and HR but results were within a similar range. SpO2 remained >96% at all times. Discussed pattern of patient's symptoms with patient and spouse, who report this has been a past complaint that only recently returned (unable to specify when). Patient and spouse advised to continue monitoring his symptoms and behavior and to seek medical attention if it does not normalize today.            Timed:  Manual Therapy:    0     mins  13681;  Therapeutic Exercise:    9     mins  14279;     Neuromuscular Marcelo:   0    mins  97498;    Therapeutic Activity:     30     mins  02863;     Gait Trainin     mins  02189;     Ultrasound:     0     mins  80729;    Electrical Stimulation:    0     mins  95984 ( );    Untimed:  Electrical Stimulation:    0     mins  58273 ( );  Mechanical Traction:    0     mins  19432;     Timed Treatment:   39   mins   Total Treatment:     39   mins    Adriano Denton PT  Physical Therapist    "

## 2022-09-08 ENCOUNTER — TREATMENT (OUTPATIENT)
Dept: PHYSICAL THERAPY | Facility: CLINIC | Age: 87
End: 2022-09-08

## 2022-09-08 DIAGNOSIS — M25.552 LEFT HIP PAIN: Primary | ICD-10-CM

## 2022-09-08 PROCEDURE — 97530 THERAPEUTIC ACTIVITIES: CPT | Performed by: PHYSICAL THERAPIST

## 2022-09-08 PROCEDURE — 97110 THERAPEUTIC EXERCISES: CPT | Performed by: PHYSICAL THERAPIST

## 2022-09-08 PROCEDURE — 97112 NEUROMUSCULAR REEDUCATION: CPT | Performed by: PHYSICAL THERAPIST

## 2022-09-08 NOTE — PROGRESS NOTES
Physical Therapy Daily Progress Note        Patient: Cresencio Blunt   : 3/29/1932  Diagnosis/ICD-10 Code:  Left hip pain [M25.552]  Referring practitioner: MILE Roy  Date of Initial Visit: Type: THERAPY  Noted: 2022  Today's Date: 2022  Patient seen for 6 sessions           Patient reports: L thigh soreness. Patient's wife reports he is moving better than he was 2 days ago and his BP was good when checked yesterday.      Objective   See Exercise, Manual, and Modality Logs for complete treatment.       Assessment/Plan  Patient demonstrated much improved tolerance for activity, with no signs of LE pain or fatigue such as repeated forward bending, compared to last visit. Continued similar routine as recent visits to ensure tolerance following previous visit.            Timed:  Manual Therapy:    0     mins  52863;  Therapeutic Exercise:    13     mins  42910;     Neuromuscular Marcelo:   10    mins  14813;    Therapeutic Activity:     30     mins  56172;     Gait Trainin     mins  36286;     Ultrasound:     0     mins  95796;    Electrical Stimulation:    0     mins  57544 ( );    Untimed:  Electrical Stimulation:    0     mins  18016 ( );  Mechanical Traction:    0     mins  73183;     Timed Treatment:   53   mins   Total Treatment:     53   mins    Adriano Denton PT  Physical Therapist

## 2022-09-13 ENCOUNTER — TREATMENT (OUTPATIENT)
Dept: PHYSICAL THERAPY | Facility: CLINIC | Age: 87
End: 2022-09-13

## 2022-09-13 DIAGNOSIS — M25.552 LEFT HIP PAIN: Primary | ICD-10-CM

## 2022-09-13 PROCEDURE — 97112 NEUROMUSCULAR REEDUCATION: CPT | Performed by: PHYSICAL THERAPIST

## 2022-09-13 PROCEDURE — 97110 THERAPEUTIC EXERCISES: CPT | Performed by: PHYSICAL THERAPIST

## 2022-09-13 NOTE — PROGRESS NOTES
Re-Assessment / Re-Certification        Patient: Cresencio Blunt   : 3/29/1932  Diagnosis/ICD-10 Code:  Left hip pain [M25.552]  Referring practitioner: MILE Roy  Date of Initial Visit: Type: THERAPY  Noted: 2022  Today's Date: 2022  Patient seen for 7 sessions      Subjective:     Subjective Questionnaire: LEFS:   Clinical Progress: improved  Home Program Compliance: N/A  Treatment has included: therapeutic exercise, neuromuscular re-education and therapeutic activity    Subjective Evaluation    History of Present Illness  Mechanism of injury: CLOF: using Rollator for community ambulation and RW or no AD for household ambulation, sitting in shower chair with Ax1 for drying off his back, independently dons/doffs socks/shoes/brace    Subjective comment: Patient reports no new changes.Pain  Current pain ratin  Location: L thigh         Objective          Strength/Myotome Testing     Left Hip   Planes of Motion   Flexion: 4  Extension: 3-  Abduction: 3-  External rotation: 4    Right Hip   Planes of Motion   Flexion: 4+  Extension: 3  Abduction: 3+  External rotation: 4-    Left Knee   Flexion: 4+  Extension: 4+ (knee pain)    Right Knee   Flexion: 4+  Extension: 5    Functional Assessment     Comments  5x sit to stand: 24 sec using 1-2 UEs on armrests with 2 failed attempts    6 min walk test: 645' using Rollator without rest      Assessment & Plan     Assessment  Impairments: abnormal coordination, abnormal gait, abnormal muscle firing, abnormal or restricted ROM, activity intolerance, impaired balance, impaired physical strength, lacks appropriate home exercise program, pain with function and safety issue  Functional Limitations: carrying objects, lifting, walking, standing and stooping  Assessment details: Patient presents with impaired safety and gait/balance deficits secondary to L>R LE weakness, L foot drop, and recent CVA causing L visual field loss and cognitive impairment  (impaired decision-making, memory loss).    9/13- Patient demonstrates improved walking endurance and some aspects of leg strength, but shows no progress with hip extension or abduction strength. Mild functional improvements reported at home, with patient walking around in his kitchen from counter to counter without AD. Many of his functional deficits are mainly due to newer cognitive and visual impairments. It's advised he continue PT to maximize safety and functional mobility.    Barriers to therapy: cognitive deficits s/p CVA, L visual field deficits, comorbidities  Prognosis: good    Goals  Plan Goals: Short Term Goals 4 weeks  1. Patient to be compliant with initial HEP. Met   2. 5x sit to  < 20 sec with no failed attempts using B UEs. Not met  3. L hip gross strength to >3/5. 50% met  4. Patient to improve LEFS score to at least 36/80. Not met   5. 6 min walk test to >450' using least restrictive AD. Met     Long Term Goals 8 weeks  1. Patient to be independent with HEP.  2. 5x sit to  < 30 sec without UEs.  3. 6 min walk test to >600' using least restrictive AD  4. Patient to improve LEFS score to at least 41/80    Plan  Therapy options: will be seen for skilled therapy services  Planned modality interventions: cryotherapy, thermotherapy (hydrocollator packs), dry needling, TENS and electrical stimulation/Russian stimulation  Planned therapy interventions: ADL retraining, balance/weight-bearing training, functional ROM exercises, home exercise program, transfer training, therapeutic activities, strengthening, neuromuscular re-education, abdominal trunk stabilization, motor coordination training, stretching, manual therapy, joint mobilization, spinal/joint mobilization and soft tissue mobilization  Frequency: 2x week  Duration in weeks: 4  Plan details: 2x/week for 4 weeks      Progress toward previous goals: Partially Met        Timeframe: 1 month  Prognosis to achieve goals: fair    PT  Signature: Adriano Denton, PT  PT License [id number]    Based upon review of the patient's progress and continued therapy plan, it is my medical opinion that Cresencio Blunt should continue physical therapy treatment at [facility]  [address] [place] [id number] [phone].    Signature: __________________________________  Norma Bueno PA    Timed:  Manual Therapy:    0     mins  64293;  Therapeutic Exercise:    25     mins  35311;     Neuromuscular Marcelo:    20    mins  34354;    Therapeutic Activity:     0     mins  60922;     Gait Trainin     mins  34220;     Ultrasound:     0     mins  72427;    Electrical Stimulation:    0     mins  38642 ( );    Untimed:  Electrical Stimulation:    0     mins  49538 ( );  Mechanical Traction:    0     mins  64164;     Timed Treatment:   45   mins   Total Treatment:     45   mins

## 2022-09-15 ENCOUNTER — TREATMENT (OUTPATIENT)
Dept: PHYSICAL THERAPY | Facility: CLINIC | Age: 87
End: 2022-09-15

## 2022-09-15 DIAGNOSIS — M25.552 LEFT HIP PAIN: Primary | ICD-10-CM

## 2022-09-15 PROCEDURE — 97530 THERAPEUTIC ACTIVITIES: CPT | Performed by: PHYSICAL THERAPIST

## 2022-09-15 PROCEDURE — 97112 NEUROMUSCULAR REEDUCATION: CPT | Performed by: PHYSICAL THERAPIST

## 2022-09-15 PROCEDURE — 97110 THERAPEUTIC EXERCISES: CPT | Performed by: PHYSICAL THERAPIST

## 2022-09-15 NOTE — PROGRESS NOTES
Physical Therapy Daily Progress Note        Patient: Cresencio Blunt   : 3/29/1932  Diagnosis/ICD-10 Code:  Left hip pain [M25.552]  Referring practitioner: MILE Roy  Date of Initial Visit: Type: THERAPY  Noted: 2022  Today's Date: 9/15/2022  Patient seen for 8 sessions           Patient reports: L knee pain and weakness, plus trouble focusing.       Objective   See Exercise, Manual, and Modality Logs for complete treatment.       Assessment/Plan  Added sit to stand on unstable surface to facilitate LE strength and stability, but this may have aggravated his L knee as he was unable to match last visit's rep count for L LE step ups and he used UEs more often during dynamic balance activities in the //bars.            Timed:  Manual Therapy:    0     mins  80526;  Therapeutic Exercise:    10     mins  25062;     Neuromuscular Marcelo:   25    mins  12307;    Therapeutic Activity:     15     mins  37754;     Gait Trainin     mins  96980;     Ultrasound:     0     mins  49939;    Electrical Stimulation:    0     mins  26626 ( );    Untimed:  Electrical Stimulation:    0     mins  44182 ( );  Mechanical Traction:    0     mins  19631;     Timed Treatment:   50   mins   Total Treatment:     50   mins    Adriano Denton PT  Physical Therapist

## 2022-09-20 ENCOUNTER — TREATMENT (OUTPATIENT)
Dept: PHYSICAL THERAPY | Facility: CLINIC | Age: 87
End: 2022-09-20

## 2022-09-20 DIAGNOSIS — M25.552 LEFT HIP PAIN: Primary | ICD-10-CM

## 2022-09-20 PROCEDURE — 97530 THERAPEUTIC ACTIVITIES: CPT | Performed by: PHYSICAL THERAPIST

## 2022-09-20 PROCEDURE — 97110 THERAPEUTIC EXERCISES: CPT | Performed by: PHYSICAL THERAPIST

## 2022-09-20 NOTE — PROGRESS NOTES
Physical Therapy Daily Progress Note        Patient: Cresencio Blunt   : 3/29/1932  Diagnosis/ICD-10 Code:  Left hip pain [M25.552]  Referring practitioner: MILE Roy  Date of Initial Visit: Type: THERAPY  Noted: 2022  Today's Date: 2022  Patient seen for 9 sessions           Patient reports: L groin pain today.      Objective   See Exercise, Manual, and Modality Logs for complete treatment.       Assessment/Plan  Groin pain limited tolerance for SPC ambulation at onset of treatment. He initially reported symptom relief standing after belt hip joint mobilization, but his gait quality rapidly declined as he reported weakness. Treatment was transitioned to table strengthening to allow patient to challenge strength with less pain.             Timed:  Manual Therapy:    5     mins  89082;  Therapeutic Exercise:    25     mins  18264;     Neuromuscular Marcelo:   0    mins  65192;    Therapeutic Activity:     15     mins  11576;     Gait Trainin     mins  76507;     Ultrasound:     0     mins  37401;    Electrical Stimulation:    0     mins  03362 ( );    Untimed:  Electrical Stimulation:    0     mins  90086 ( );  Mechanical Traction:    0     mins  29508;     Timed Treatment:   45   mins   Total Treatment:     45   mins    Adriano Denton PT  Physical Therapist

## 2022-09-22 ENCOUNTER — TREATMENT (OUTPATIENT)
Dept: PHYSICAL THERAPY | Facility: CLINIC | Age: 87
End: 2022-09-22

## 2022-09-22 DIAGNOSIS — M25.552 LEFT HIP PAIN: Primary | ICD-10-CM

## 2022-09-22 PROCEDURE — 97530 THERAPEUTIC ACTIVITIES: CPT | Performed by: PHYSICAL THERAPIST

## 2022-09-22 PROCEDURE — 97110 THERAPEUTIC EXERCISES: CPT | Performed by: PHYSICAL THERAPIST

## 2022-09-22 NOTE — PROGRESS NOTES
"Physical Therapy Daily Progress Note        Patient: Cresencio Blunt   : 3/29/1932  Diagnosis/ICD-10 Code:  Left hip pain [M25.552]  Referring practitioner: MILE Roy  Date of Initial Visit: Type: THERAPY  Noted: 2022  Today's Date: 2022  Patient seen for 10 sessions           Patient reports: \"a little L hip pain\".      Objective   See Exercise, Manual, and Modality Logs for complete treatment.       Assessment/Plan  Patient demonstrating behavior that could suggest increased pain, specifically pausing during gait and forward bending at the trunk while looking at and placing a hand on his L thigh (patient demonstrates decreased communication skills s/p CVA). Today is the first time PT has observed this when using Rollator. PT discussed this decline in gait quality with the patient and spouse. Patient states he perceives weakness in his hip and back, but his subjective report has recently been unreliable. They were advised to follow up with his hip surgeon for assessment.             Timed:  Manual Therapy:    0     mins  91389;  Therapeutic Exercise:    15     mins  87925;     Neuromuscular Marcelo:   0    mins  64866;    Therapeutic Activity:     23     mins  90890;     Gait Trainin     mins  48954;     Ultrasound:     0     mins  77686;    Electrical Stimulation:    0     mins  78046 ( );    Untimed:  Electrical Stimulation:    0     mins  97639 ( );  Mechanical Traction:    0     mins  29416;     Timed Treatment:   38   mins   Total Treatment:     38   mins    Adriano Denton PT  Physical Therapist    "

## 2022-09-27 ENCOUNTER — TREATMENT (OUTPATIENT)
Dept: PHYSICAL THERAPY | Facility: CLINIC | Age: 87
End: 2022-09-27

## 2022-09-27 DIAGNOSIS — M25.552 LEFT HIP PAIN: Primary | ICD-10-CM

## 2022-09-27 PROCEDURE — 97112 NEUROMUSCULAR REEDUCATION: CPT | Performed by: PHYSICAL THERAPIST

## 2022-09-27 PROCEDURE — 97530 THERAPEUTIC ACTIVITIES: CPT | Performed by: PHYSICAL THERAPIST

## 2022-09-27 PROCEDURE — 97110 THERAPEUTIC EXERCISES: CPT | Performed by: PHYSICAL THERAPIST

## 2022-09-27 NOTE — PROGRESS NOTES
Physical Therapy Daily Progress Note        Patient: Cresencio Blunt   : 3/29/1932  Diagnosis/ICD-10 Code:  Left hip pain [M25.552]  Referring practitioner: MILE Roy  Date of Initial Visit: Type: THERAPY  Noted: 2022  Today's Date: 2022  Patient seen for 11 sessions           Patient's spouse reports his PCP discovered a UTI and he has received treatment for the past 5 days. She states he has been moving and feeling better since then.      Objective   See Exercise, Manual, and Modality Logs for complete treatment.       Assessment/Plan  Able to resume previous functional interventions for gait training, stairs, dynamic balance, and forced attention to his neglected L side. He struggled more cognitively than in previous visits with awareness of his environment to his L. No pain reported today, suggesting UTI may have been primary cause of his functional decline. Patient sees ortho in 2 days and his spouse was advised to bring his lack of improvement in gluteal strength to their attention.             Timed:  Manual Therapy:    0     mins  36578;  Therapeutic Exercise:    12     mins  16702;     Neuromuscular Marcelo:   21    mins  18750;    Therapeutic Activity:     10     mins  70406;     Gait Trainin     mins  33099;     Ultrasound:     0     mins  26688;    Electrical Stimulation:    0     mins  15359 ( );    Untimed:  Electrical Stimulation:    0     mins  11941 ( );  Mechanical Traction:    0     mins  77334;     Timed Treatment:   45   mins   Total Treatment:     45   mins    Adriano Denton PT  Physical Therapist

## 2022-10-04 ENCOUNTER — TREATMENT (OUTPATIENT)
Dept: PHYSICAL THERAPY | Facility: CLINIC | Age: 87
End: 2022-10-04

## 2022-10-04 DIAGNOSIS — M25.552 LEFT HIP PAIN: Primary | ICD-10-CM

## 2022-10-04 PROCEDURE — 97110 THERAPEUTIC EXERCISES: CPT | Performed by: PHYSICAL THERAPIST

## 2022-10-04 NOTE — PROGRESS NOTES
Physical Therapy Daily Progress Note        Patient: Cresencio Blunt   : 3/29/1932  Diagnosis/ICD-10 Code:  Left hip pain [M25.552]  Referring practitioner: MILE Roy  Date of Initial Visit: Type: THERAPY  Noted: 2022  Today's Date: 10/4/2022  Patient seen for 12 sessions           Patient reports: his orthopedist informed him his hip surgery is healing well, though he did not comment on his hip joint. He reports thigh pain today.      Objective   See Exercise, Manual, and Modality Logs for complete treatment.       Assessment/Plan  Patient was very limited in ambulation tolerance today, both with SPC and Rollator. Screen of L hip revealed pain and limitation in PROM flexion, adduction, abduction, and IR (extension noted tested due to difficulty positioning). Patient also reported general weakness, which was a symptom prior to his diagnosis of UTI. Patient's spouse advised to contact PCP regarding recheck to ensure resolution of UTI. PT called and left a message for his referring provider to discuss his case. Patient and spouse advised to cancel next PT visit if he appears to be limited that morning.            Timed:  Manual Therapy:    0     mins  81146;  Therapeutic Exercise:    30     mins  49649;     Neuromuscular Marcelo:   0    mins  16982;    Therapeutic Activity:     0     mins  28740;     Gait Trainin     mins  27574;     Ultrasound:     0     mins  01438;    Electrical Stimulation:    0     mins  34450 ( );    Untimed:  Electrical Stimulation:    0     mins  08104 ( );  Mechanical Traction:    0     mins  71311;     Timed Treatment:   30   mins   Total Treatment:     30   mins    Adriano Denton PT  Physical Therapist

## 2022-10-06 ENCOUNTER — TELEPHONE (OUTPATIENT)
Dept: PHYSICAL THERAPY | Facility: OTHER | Age: 87
End: 2022-10-06

## 2022-10-11 ENCOUNTER — TREATMENT (OUTPATIENT)
Dept: PHYSICAL THERAPY | Facility: CLINIC | Age: 87
End: 2022-10-11

## 2022-10-11 DIAGNOSIS — M25.552 LEFT HIP PAIN: Primary | ICD-10-CM

## 2022-10-11 PROCEDURE — 97110 THERAPEUTIC EXERCISES: CPT | Performed by: PHYSICAL THERAPIST

## 2022-10-11 PROCEDURE — 97530 THERAPEUTIC ACTIVITIES: CPT | Performed by: PHYSICAL THERAPIST

## 2022-10-11 NOTE — PROGRESS NOTES
Physical Therapy Daily Progress Note and Discharge Summary    [address] [facility], [address]  [place] [address]      Patient: Cresencio Blunt   : 3/29/1932  Diagnosis/ICD-10 Code:  Left hip pain [M25.552]  Referring practitioner: MILE Roy  Date of Initial Visit: Type: THERAPY  Noted: 2022  Today's Date: 10/11/2022  Patient seen for 13 sessions           Patient reports: going to ED the day of last visit for assessment. He received assessment for cardiac problems and UTI, which were ruled out. Imaging discovered a cyst on his R kidney, for which he may follow up with his PCP. Continues to report L anterior thigh soreness.      Objective   See Exercise, Manual, and Modality Logs for complete treatment.     Goals  Plan Goals: Short Term Goals 4 weeks  1. Patient to be compliant with initial HEP. Met   2. 5x sit to  < 20 sec with no failed attempts using B UEs. Not met  3. L hip gross strength to >3/5. 50% met  4. Patient to improve LEFS score to at least 36/80. Not met   5. 6 min walk test to >450' using least restrictive AD. Met     Long Term Goals 8 weeks  1. Patient to be independent with HEP. Not met  2. 5x sit to  < 30 sec without UEs. Not met  3. 6 min walk test to >600' using least restrictive AD. Not met  4. Patient to improve LEFS score to at least 41/80. Not met    Discharge Summary  Patient again demonstrated poor activity tolerance today, with increased standing rest breaks using Rollator and decline in upright standing tolerance. He reported general fatigue in addition to L anterior thigh pain. PT discussed patient's recent status during therapy visits with patient and his spouse and advised they seek additional follow up with PCP and discharge from physical therapy at this time due to recent decline in function. His recent decline suggests poor potential to progress with additional visits.            Timed:  Manual Therapy:    0     mins  40147;  Therapeutic Exercise:     10     mins  66904;     Neuromuscular Marcelo:   0    mins  25692;    Therapeutic Activity:     30     mins  71831;     Gait Trainin     mins  05692;     Ultrasound:     0     mins  08812;    Electrical Stimulation:    0     mins  91774 ( );    Untimed:  Electrical Stimulation:    0     mins  13038 ( );  Mechanical Traction:    0     mins  69063;     Timed Treatment:   40   mins   Total Treatment:     40   mins    Adriano Denton PT  Physical Therapist